# Patient Record
Sex: FEMALE | Race: WHITE | URBAN - METROPOLITAN AREA
[De-identification: names, ages, dates, MRNs, and addresses within clinical notes are randomized per-mention and may not be internally consistent; named-entity substitution may affect disease eponyms.]

---

## 2017-02-16 ENCOUNTER — APPOINTMENT (OUTPATIENT)
Dept: ULTRASOUND IMAGING | Facility: CLINIC | Age: 52
End: 2017-02-16
Attending: NURSE PRACTITIONER
Payer: COMMERCIAL

## 2017-02-16 ENCOUNTER — TRANSFERRED RECORDS (OUTPATIENT)
Dept: HEALTH INFORMATION MANAGEMENT | Facility: CLINIC | Age: 52
End: 2017-02-16

## 2017-02-16 ENCOUNTER — HOSPITAL ENCOUNTER (OUTPATIENT)
Facility: CLINIC | Age: 52
Setting detail: OBSERVATION
Discharge: HOME | End: 2017-02-17
Attending: NURSE PRACTITIONER | Admitting: FAMILY MEDICINE
Payer: COMMERCIAL

## 2017-02-16 DIAGNOSIS — D50.0 IRON DEFICIENCY ANEMIA DUE TO CHRONIC BLOOD LOSS: Primary | ICD-10-CM

## 2017-02-16 DIAGNOSIS — N93.9 VAGINAL BLEEDING: ICD-10-CM

## 2017-02-16 DIAGNOSIS — D64.9 ANEMIA, UNSPECIFIED TYPE: ICD-10-CM

## 2017-02-16 LAB
ABO + RH BLD: NORMAL
ABO + RH BLD: NORMAL
ALBUMIN SERPL-MCNC: 3.7 G/DL (ref 3.4–5)
ALP SERPL-CCNC: 82 U/L (ref 40–150)
ALT SERPL W P-5'-P-CCNC: 24 U/L (ref 0–50)
ANION GAP SERPL CALCULATED.3IONS-SCNC: 8 MMOL/L (ref 3–14)
AST SERPL W P-5'-P-CCNC: 24 U/L (ref 0–45)
BILIRUB SERPL-MCNC: 0.3 MG/DL (ref 0.2–1.3)
BLD GP AB SCN SERPL QL: NORMAL
BLD PROD TYP BPU: NORMAL
BLD PROD TYP BPU: NORMAL
BLD UNIT ID BPU: 0
BLOOD BANK CMNT PATIENT-IMP: NORMAL
BLOOD PRODUCT CODE: NORMAL
BPU ID: NORMAL
BUN SERPL-MCNC: 13 MG/DL (ref 7–30)
CALCIUM SERPL-MCNC: 8.2 MG/DL (ref 8.5–10.1)
CHLORIDE SERPL-SCNC: 108 MMOL/L (ref 94–109)
CO2 SERPL-SCNC: 26 MMOL/L (ref 20–32)
CREAT SERPL-MCNC: 0.7 MG/DL (ref 0.52–1.04)
ERYTHROCYTE [DISTWIDTH] IN BLOOD BY AUTOMATED COUNT: 15.6 % (ref 10–15)
GFR SERPL CREATININE-BSD FRML MDRD: 88 ML/MIN/1.7M2
GLUCOSE SERPL-MCNC: 95 MG/DL (ref 70–99)
HCT VFR BLD AUTO: 20.1 % (ref 35–47)
HGB BLD-MCNC: 6 G/DL (ref 11.7–15.7)
MCH RBC QN AUTO: 22.6 PG (ref 26.5–33)
MCHC RBC AUTO-ENTMCNC: 29.9 G/DL (ref 31.5–36.5)
MCV RBC AUTO: 76 FL (ref 78–100)
NUM BPU REQUESTED: 1
PLATELET # BLD AUTO: 459 10E9/L (ref 150–450)
POTASSIUM SERPL-SCNC: 3.3 MMOL/L (ref 3.4–5.3)
PROT SERPL-MCNC: 6.9 G/DL (ref 6.8–8.8)
RBC # BLD AUTO: 2.65 10E12/L (ref 3.8–5.2)
SODIUM SERPL-SCNC: 142 MMOL/L (ref 133–144)
SPECIMEN EXP DATE BLD: NORMAL
TRANSFUSION STATUS PATIENT QL: NORMAL
TRANSFUSION STATUS PATIENT QL: NORMAL
WBC # BLD AUTO: 10.3 10E9/L (ref 4–11)

## 2017-02-16 PROCEDURE — 25000128 H RX IP 250 OP 636: Performed by: FAMILY MEDICINE

## 2017-02-16 PROCEDURE — 80053 COMPREHEN METABOLIC PANEL: CPT | Performed by: NURSE PRACTITIONER

## 2017-02-16 PROCEDURE — 99219 ZZC INITIAL OBSERVATION CARE,LEVL II: CPT | Performed by: FAMILY MEDICINE

## 2017-02-16 PROCEDURE — 83605 ASSAY OF LACTIC ACID: CPT | Performed by: FAMILY MEDICINE

## 2017-02-16 PROCEDURE — 93005 ELECTROCARDIOGRAM TRACING: CPT

## 2017-02-16 PROCEDURE — 36415 COLL VENOUS BLD VENIPUNCTURE: CPT | Performed by: FAMILY MEDICINE

## 2017-02-16 PROCEDURE — 36430 TRANSFUSION BLD/BLD COMPNT: CPT

## 2017-02-16 PROCEDURE — 99285 EMERGENCY DEPT VISIT HI MDM: CPT | Mod: 25

## 2017-02-16 PROCEDURE — 86850 RBC ANTIBODY SCREEN: CPT | Performed by: NURSE PRACTITIONER

## 2017-02-16 PROCEDURE — 25000132 ZZH RX MED GY IP 250 OP 250 PS 637: Performed by: FAMILY MEDICINE

## 2017-02-16 PROCEDURE — P9016 RBC LEUKOCYTES REDUCED: HCPCS | Performed by: NURSE PRACTITIONER

## 2017-02-16 PROCEDURE — 85027 COMPLETE CBC AUTOMATED: CPT | Performed by: NURSE PRACTITIONER

## 2017-02-16 PROCEDURE — 76830 TRANSVAGINAL US NON-OB: CPT

## 2017-02-16 PROCEDURE — 86900 BLOOD TYPING SEROLOGIC ABO: CPT | Performed by: NURSE PRACTITIONER

## 2017-02-16 PROCEDURE — 86901 BLOOD TYPING SEROLOGIC RH(D): CPT | Performed by: NURSE PRACTITIONER

## 2017-02-16 PROCEDURE — 86923 COMPATIBILITY TEST ELECTRIC: CPT | Performed by: NURSE PRACTITIONER

## 2017-02-16 PROCEDURE — G0378 HOSPITAL OBSERVATION PER HR: HCPCS

## 2017-02-16 RX ORDER — IBUPROFEN 600 MG/1
600 TABLET, FILM COATED ORAL EVERY 6 HOURS PRN
Status: DISCONTINUED | OUTPATIENT
Start: 2017-02-16 | End: 2017-02-17 | Stop reason: HOSPADM

## 2017-02-16 RX ORDER — ACETAMINOPHEN 325 MG/1
650 TABLET ORAL EVERY 4 HOURS PRN
Status: DISCONTINUED | OUTPATIENT
Start: 2017-02-16 | End: 2017-02-17 | Stop reason: HOSPADM

## 2017-02-16 RX ORDER — SODIUM CHLORIDE 9 MG/ML
INJECTION, SOLUTION INTRAVENOUS CONTINUOUS
Status: DISCONTINUED | OUTPATIENT
Start: 2017-02-16 | End: 2017-02-17 | Stop reason: HOSPADM

## 2017-02-16 RX ORDER — NALOXONE HYDROCHLORIDE 0.4 MG/ML
.1-.4 INJECTION, SOLUTION INTRAMUSCULAR; INTRAVENOUS; SUBCUTANEOUS
Status: DISCONTINUED | OUTPATIENT
Start: 2017-02-16 | End: 2017-02-17 | Stop reason: HOSPADM

## 2017-02-16 RX ORDER — DOCUSATE SODIUM 100 MG/1
100 CAPSULE, LIQUID FILLED ORAL ONCE
Status: DISCONTINUED | OUTPATIENT
Start: 2017-02-16 | End: 2017-02-17 | Stop reason: HOSPADM

## 2017-02-16 RX ORDER — ZOLPIDEM TARTRATE 5 MG/1
5 TABLET ORAL
Status: DISCONTINUED | OUTPATIENT
Start: 2017-02-16 | End: 2017-02-17 | Stop reason: HOSPADM

## 2017-02-16 RX ADMIN — NORETHINDRONE AND ETHINYL ESTRADIOL 1 TABLET: KIT at 22:43

## 2017-02-16 RX ADMIN — SODIUM CHLORIDE: 9 INJECTION, SOLUTION INTRAVENOUS at 22:42

## 2017-02-16 ASSESSMENT — ENCOUNTER SYMPTOMS
SHORTNESS OF BREATH: 1
DIZZINESS: 1
LIGHT-HEADEDNESS: 1
BLOOD IN STOOL: 0

## 2017-02-16 NOTE — IP AVS SNAPSHOT
MRN:[id number]                      After Visit Summary   2/16/2017    Avelina Ta    MRN: [id number]           Thank you!     Thank you for choosing [facility] for your care. Our goal is always to provide you with excellent care. Hearing back from our patients is one way we can continue to improve our services. Please take a few minutes to complete the written survey that you may receive in the mail after you visit. If you would like to speak to someone directly about your visit please contact Patient Relations at [phone]. Thank you!          Patient Information     Date Of Birth          1965        About your hospital stay     You were admitted on:  February 16, 2017 You last received care in the:  [facility] Observation Department    You were discharged on:  February 17, 2017       Who to Call     For medical emergencies, please call 911.  For non-urgent questions about your medical care, please call your primary care provider or clinic, None          Attending Provider     Provider Specialty    David Steiner APRN CNP Family Practice    Myla Alvarez,  OB/Gyn       Primary Care Provider    Physician No Ref-Primary       No address on file         Follow-up Information     Follow up with [facility]. Schedule an appointment as soon as possible for a visit in 1 week(s).    Specialties:  Sports Medicine, Pain Management, Obstetrics & Gynecology, Pediatrics, Internal Medicine, Nephrology    Why:  With OB GYN in 1-2  weeks as long as bleeding is otherwise stable.    Contact information:    [address] [place] [address]  [facility] [address] [phone]                Pending Results     No orders found for last 3 day(s).            Statement of Approval     Ordered          02/17/17 1200  I have reviewed and agree with all the recommendations and orders detailed in this document.  EFFECTIVE NOW     Approved  "and electronically signed by:  Kaity Barrett MD             Admission Information     Date & Time Provider Department Dept. Phone    2017 Myla Alvarez DO [facility] Observation Department [phone]      Your Vitals Were     Blood Pressure Pulse Temperature Respirations Weight Pulse Oximetry    131/87 (BP Location: Left arm) 109 98.2  F (36.8  C) (Oral) 16 81.6 kg (180 lb) 99%      MyChart Information     [facility] lets you send messages to your doctor, view your test results, renew your prescriptions, schedule appointments and more. To sign up, go to [email or web address] . Click on \"Log in\" on the left side of the screen, which will take you to the Welcome page. Then click on \"Sign up Now\" on the right side of the page.     You will be asked to enter the access code listed below, as well as some personal information. Please follow the directions to create your username and password.     Your access code is: [email or web address]  Expires: 2017 12:03 PM     Your access code will  in 90 days. If you need help or a new code, please call your [facility] clinic or [phone].        Care EveryWhere ID     This is your Care EveryWhere ID. This could be used by other organizations to access your [facility] medical records  [id number]           Review of your medicines      START taking        Dose / Directions    docusate sodium 100 MG capsule   Commonly known as:  COLACE        Dose:  100 mg   Take 1 capsule (100 mg) by mouth 2 times daily   Quantity:  1 capsule   Refills:  0       ferrous sulfate 325 (65 FE) MG tablet   Commonly known as:  IRON        Dose:  325 mg   Take 1 tablet (325 mg) by mouth 2 times daily   Quantity:  60 tablet   Refills:  2       norethindrone-ethinyl estradiol 0.5-35 MG-MCG per tablet   Commonly known as:  NECON   Used for:  Vaginal bleeding        Take 4 pills a day for 2 days, then 3 pills a day for 3 days, then 2 pills a day for 3 days, then once daily. " Skip the last week of placebo pills and start a new pack right away.   Quantity:  28 tablet   Refills:  3            Where to get your medicines      These medications were sent to [facility], [place] - [address]  [address] [facility] [address]     Phone:  [phone]     ferrous sulfate 325 (65 FE) MG tablet    norethindrone-ethinyl estradiol 0.5-35 MG-MCG per tablet         Some of these will need a paper prescription and others can be bought over the counter. Ask your nurse if you have questions.     You don't need a prescription for these medications     docusate sodium 100 MG capsule                Protect others around you: Learn how to safely use, store and throw away your medicines at www.disposemymeds.org.             Medication List: This is a list of all your medications and when to take them. Check marks below indicate your daily home schedule. Keep this list as a reference.      Medications           Morning Afternoon Evening Bedtime As Needed    docusate sodium 100 MG capsule   Commonly known as:  COLACE   Take 1 capsule (100 mg) by mouth 2 times daily                                ferrous sulfate 325 (65 FE) MG tablet   Commonly known as:  IRON   Take 1 tablet (325 mg) by mouth 2 times daily                                norethindrone-ethinyl estradiol 0.5-35 MG-MCG per tablet   Commonly known as:  NECON   Take 4 pills a day for 2 days, then 3 pills a day for 3 days, then 2 pills a day for 3 days, then once daily. Skip the last week of placebo pills and start a new pack right away.                                          More Information        Heavy Menstrual Bleeding    Heavy menstrual bleeding means that your periods are heavier or longer than usual. You may soak through a pad or tampon every 1 to 3 hours on the heaviest days of your period. You may also pass large, dark clots. And your periods may last longer than 7 days.  If you have heavy  periods often, this can cause a problem called anemia. With anemia, your red blood cell count is too low. Red blood cells are needed because they help carry oxygen throughout your body. Severe anemia may cause you to look pale and feel weak or tired. You might also become short of breath easily.  There are many possible causes of heavy menstrual bleeding. Hormonal imbalance is the most common cause. Having benign growths in your uterus, such as fibroids or polyps, is another cause. Taking certain medicines or having certain health problems or bleeding disorders are also causes.  To treat heavy menstrual bleeding, medicines are often tried first. If these don t help, further testing and treatments will likely be needed.  Home care  Medicines  If you re prescribed medicines, be sure to take them as directed.    To help control heavy bleeding, any of the following may be used:    Hormone therapy (this includes all methods of hormonal birth control such as pills, shots, cream, ring, patch, or hormone-releasing IUD)    Nonsteroidal anti-inflammatory drugs (NSAIDs), such as ibuprofen    Antifibrinolytic medicines, such as transexamic acid    To help treat anemia, iron supplements may be prescribed.            General care    Get plenty of rest if you tire easily. Avoid heavy exertion.    To help relieve pain or cramping, try using a heating pad on the lower belly or back. A warm bath may also help.  Follow-up care  Follow up with your healthcare provider as directed.  When to seek medical advice  Call your healthcare provider right away if any of these occur:    Heavier bleeding (soaking 1 pad or tampon every hour for 3 hours)    Heavy bleeding that lasts longer than 1 week    Fever of 100.4 F (38 C) or higher, or as directed by your provider    Pain or cramping that gets worse instead of better    Signs of anemia such as pale skin, extreme fatigue or weakness, or shortness of breath    Dizziness or fainting    3344-2114  [employer]. [address] [place] [address], [place] [address]. All rights reserved. This information is not intended as a substitute for professional medical care. Always follow your healthcare professional's instructions.

## 2017-02-16 NOTE — IP AVS SNAPSHOT
[facility] Observation Department    [address]    [facility] [address]    Phone:  [phone]                                       After Visit Summary   2/16/2017    Aevlina Ta    MRN: [id number]           After Visit Summary Signature Page     I have received my discharge instructions, and my questions have been answered. I have discussed any challenges I see with this plan with the nurse or doctor.    ..........................................................................................................................................  Patient/Patient Representative Signature      ..........................................................................................................................................  Patient Representative Print Name and Relationship to Patient    ..................................................               ................................................  Date                                            Time    ..........................................................................................................................................  Reviewed by Signature/Title    ...................................................              ..............................................  Date                                                            Time

## 2017-02-17 VITALS
DIASTOLIC BLOOD PRESSURE: 87 MMHG | TEMPERATURE: 98.2 F | OXYGEN SATURATION: 99 % | HEART RATE: 109 BPM | SYSTOLIC BLOOD PRESSURE: 131 MMHG | WEIGHT: 180 LBS | RESPIRATION RATE: 16 BRPM

## 2017-02-17 LAB
ERYTHROCYTE [DISTWIDTH] IN BLOOD BY AUTOMATED COUNT: 15.9 % (ref 10–15)
HCT VFR BLD AUTO: 20.8 % (ref 35–47)
HGB BLD-MCNC: 6.5 G/DL (ref 11.7–15.7)
INTERPRETATION ECG - MUSE: NORMAL
LACTATE BLD-SCNC: 0.7 MMOL/L (ref 0.7–2.1)
MCH RBC QN AUTO: 23.9 PG (ref 26.5–33)
MCHC RBC AUTO-ENTMCNC: 31.3 G/DL (ref 31.5–36.5)
MCV RBC AUTO: 77 FL (ref 78–100)
PLATELET # BLD AUTO: 399 10E9/L (ref 150–450)
POTASSIUM SERPL-SCNC: 3.6 MMOL/L (ref 3.4–5.3)
RBC # BLD AUTO: 2.72 10E12/L (ref 3.8–5.2)
WBC # BLD AUTO: 6.7 10E9/L (ref 4–11)

## 2017-02-17 PROCEDURE — G0378 HOSPITAL OBSERVATION PER HR: HCPCS

## 2017-02-17 PROCEDURE — 25000128 H RX IP 250 OP 636: Performed by: FAMILY MEDICINE

## 2017-02-17 PROCEDURE — 96360 HYDRATION IV INFUSION INIT: CPT

## 2017-02-17 PROCEDURE — 85027 COMPLETE CBC AUTOMATED: CPT | Performed by: FAMILY MEDICINE

## 2017-02-17 PROCEDURE — 84132 ASSAY OF SERUM POTASSIUM: CPT | Performed by: FAMILY MEDICINE

## 2017-02-17 PROCEDURE — 99217 ZZC OBSERVATION CARE DISCHARGE: CPT | Performed by: OBSTETRICS & GYNECOLOGY

## 2017-02-17 PROCEDURE — 36415 COLL VENOUS BLD VENIPUNCTURE: CPT | Performed by: FAMILY MEDICINE

## 2017-02-17 PROCEDURE — 96361 HYDRATE IV INFUSION ADD-ON: CPT

## 2017-02-17 RX ORDER — FERROUS SULFATE 325(65) MG
325 TABLET ORAL 2 TIMES DAILY
Qty: 60 TABLET | Refills: 2 | Status: SHIPPED | OUTPATIENT
Start: 2017-02-17

## 2017-02-17 RX ORDER — DOCUSATE SODIUM 100 MG/1
100 CAPSULE, LIQUID FILLED ORAL 2 TIMES DAILY
Qty: 1 CAPSULE | Refills: 0 | COMMUNITY
Start: 2017-02-17

## 2017-02-17 RX ADMIN — SODIUM CHLORIDE: 9 INJECTION, SOLUTION INTRAVENOUS at 08:18

## 2017-02-17 NOTE — H&P
[facility]  History and Physical    Avelina Ta MRN# [id number]   Age: 51 year old YOB: 1965     Date of Admission:  2/16/2017    Primary care provider: No Ref-Primary, Physician           Chief Complaint:   Avelina Ta is a 51 year old female with anemia 2/2 menorrhagia for 14 days.   Admission to ER hemoglobin is 6.  She underwent 1 U PRBC in ER and will be admitted to   The floor for observation.  She hasn't had a physical or pap smear for 10+ years.       Active Problem List  Patient Active Problem List   Diagnosis     Anemia     Gyn History:   Not sexually active  Menarch 12-13   Menses monthly, irregular last few months     Ob hx:   No hx of pregnancy    Medication Prior to Admission    (Not in a hospital admission).        Maternal Past Medical History:   History reviewed. No pertinent past medical history.                    Family History:   This patient has no significant family history            Social History:   This patient has no significant social history         Review of Systems:   C: NEGATIVE for fever, chills, change in weight  I: NEGATIVE for worrisome rashes, moles or lesions  E: NEGATIVE for vision changes or irritation  E/M: NEGATIVE for ear, mouth and throat problems  R: NEGATIVE for significant cough or SOB  B: NEGATIVE for masses, tenderness or discharge  CV: NEGATIVE for chest pain, palpitations or peripheral edema  GI: NEGATIVE for nausea, abdominal pain, heartburn, or change in bowel habits  : NEGATIVE for frequency, dysuria, or hematuria  M: NEGATIVE for significant arthralgias or myalgia  N: NEGATIVE for weakness, dizziness or paresthesias  E: NEGATIVE for temperature intolerance, skin/hair changes  H: NEGATIVE for bleeding problems  P: NEGATIVE for changes in mood or affect          Physical Exam:     Vitals were reviewed  All vitals stable    Patient Vitals for the past 8 hrs:   BP Temp Temp src Pulse Heart Rate Resp SpO2 Weight   02/16/17 2130 (!)  161/97 98.7  F (37.1  C) - 119 - 24 - -   02/16/17 2115 (!) 157/91 98.5  F (36.9  C) - 119 - 22 - -   02/16/17 2045 - - - - - - 99 % -   02/16/17 2030 (!) 156/95 - - - - - 100 % -   02/16/17 2019 - - - - - - 100 % -   02/16/17 2018 (!) 156/98 - - - - - - -   02/16/17 1939 127/64 98  F (36.7  C) Temporal 115 115 18 100 % 81.6 kg (180 lb)     Constitutional: Awake, alert, cooperative, no apparent distress, and appears stated age.  Eyes: Lids and lashes normal, pupils equal, round and reactive to light, extra ocular muscles intact, sclera clear, conjunctiva normal.  ENT: Normocephalic, without obvious abnormality, atramatic, sinuses nontender on palpation, external ears without lesions, oral pharynx with moist mucus membranes  Neck: Supple, symmetrical, trachea midline, no adenopathy, thyroid symmetric, not enlarged and no tenderness, skin normal.  Hematologic / Lymphatic: No cervical lymphadenopathy and no supraclavicular lymphadenopathy.  Back: Symmetric, no curvature, spinous processes are non-tender on palpation, paraspinous muscles are non-tender on palpation, no costal vertebral tenderness.  Lungs: No increased work of breathing, good air exchange, clear to auscultation bilaterally, no crackles or wheezing.  Cardiovascular: Regular rate and rhythm, normal S1 and S2, no S3 or S4, and no murmur noted.  Abdomen: No scars, normal bowel sounds, soft, non-distended, non-tender, no masses palpated, no hepatosplenomegally.  Genitourinary: No urethral discharge, normal external genitalia, no hernia.  Musculoskeletal: No redness, warmth, or swelling of the joints.  Full range of motion noted.  Motor strength is 5 out of 5 all extremities bilaterally.  Tone is normal.  Neurologic: Awake, alert, oriented to name, place and time.  Cranial nerves II-XII are grossly intact.  Motor is 5 out of 5 bilaterally.   Skin: No rashes, erythema, pallor, petechia or purpura.   GYN PELVIC: NEGATIVE, normal external genitalia,  normal  vaginal mucosa, normal cervix and normal uterus, adnexa, no masses or tenderness On sterile vaginal exam.  Patient Declined speculum exam.  Mild vaginal bleeding.                    Assessment:   Avelina Ta is a 51 year old female with anemia 2/2 menorrhagia for 14 days.   Admission to ER hemoglobin is 6.  She is undergoing 1 U PRBC in ER and will be admitted to   The floor for observation.  She hasn't had a physical or pap smear for 10+ years.  Currently mild vaginal bleeding.          Plan:   Observation overnight   Repeat hemoglobin in am   Start taper pack of Oral Contraceptive 4 tablets today x 3 days, then 3 tab daily x 2 days, then   2 tablets for 2 days, then daily until no bleeding for 1 week.  Nortrel is available in ER pharmacy.      Myla Alvarez DO

## 2017-02-17 NOTE — PLAN OF CARE
Problem: Discharge Planning  Goal: Discharge Planning (Adult, OB, Behavioral, Peds)  Outcome: Improving  PRIMARY DIAGNOSIS: ANEMIA/ hgb 6.5  OUTPATIENT/OBSERVATION GOALS TO BE MET BEFORE DISCHARGE:  1. Vital Signs stable: Yes  2. Pain status: Pain free.  3. Number of Bleeding Episodes during this shift: patient continues to have menses,but reported less bleeding  4. ADLs back to baseline? Yes  5. Cleared by consultants? No  6. Barriers to discharge noted: No  7. Interpretation of rhythm per telemetry tech: n/a   Is patient a falls risk? No,pt denies weakness and dizziness when up walking  Falls armband on? YES  Within Arm's Reach? Yes   Bed alarm turned on? No  Personal alarm in place and turned on? No    Pt. discharge home in stable condition,pt walked.Discharge instruction reviewed with  The patient.

## 2017-02-17 NOTE — PLAN OF CARE
Problem: Discharge Planning  Goal: Discharge Planning (Adult, OB, Behavioral, Peds)  Outcome: Improving  PRIMARY DIAGNOSIS: ANEMIA/ hgb 6.0  OUTPATIENT/OBSERVATION GOALS TO BE MET BEFORE DISCHARGE:  1.     Vital Signs stable: Yes  2.     Pain status: Pain free.  3.     Number of Bleeding Episodes during this shift: patient continues to have menses  4.     ADLs back to baseline? Yes  5.     Cleared by consultants? No  6.     Barriers to discharge noted: No  7.     Interpretation of rhythm per telemetry tech: n/a   Is patient a falls risk? Yes  Reason for falls risk: Other- due to hgb being significantly low,states has felt weak  Falls armband on? YES  Within Arm's Reach? Yes   Bed alarm turned on? No  Personal alarm in place and turned on? No

## 2017-02-17 NOTE — PLAN OF CARE
Problem: Discharge Planning  Goal: Discharge Planning (Adult, OB, Behavioral, Peds)  Outcome: Improving  PRIMARY DIAGNOSIS: ANEMIA/ hgb 6.5  OUTPATIENT/OBSERVATION GOALS TO BE MET BEFORE DISCHARGE:  1. Vital Signs stable: Yes  2. Pain status: Pain free.  3. Number of Bleeding Episodes during this shift: patient continues to have menses,but reported less bleeding  4. ADLs back to baseline? Yes  5. Cleared by consultants? No  6. Barriers to discharge noted: No  7. Interpretation of rhythm per telemetry tech: n/a   Is patient a falls risk? No,pt denies weakness and dizziness when up walking  Falls armband on? YES  Within Arm's Reach? Yes   Bed alarm turned on? No  Personal alarm in place and turned on? No

## 2017-02-17 NOTE — PROVIDER NOTIFICATION
notified about pt's Hgb of 6.5.Discussed patient condition.Infromed that patient   is in stable condtion,VSS, and  had less vaginal  bleeding.

## 2017-02-17 NOTE — ED PROVIDER NOTES
History     Chief Complaint:  Abnormal labs    HPI   Avelina Ta is a 51 year old female who presents with abnormal labs. Today, the patient began experiencing dizziness, lightheadedness, and mild shortness of breath. She presented to the clinic and had a hemoglobin of 6. The patient states that she has been experiencing a heavy menstrual cycle for around 12+ days.  Currently bleeding has decreased and she has needed to change her pad every couple of hours instead of more frequently. She missed her period in December, but her last menstrual cycle was heavy and lasted for about 14 days as well. No blood in her stool. No cough or cold symptoms. The patient is not on blood thinners. She denies a history of cancer, anemia, or blood transfusion.     CBC:  WBC 11.6 (H), HGB 6.0 (L),  (H)    Cardiac Risk Factors   Sex: female   Tobacco: Negative   Hypertension: Negative  Diabetes: Negative  Hyperlipidemia: Negative  Family History: Negative    Allergies:  No known drug allergies.    Medications:    The patient is currently on no regular medications.    Past Medical History:    History reviewed.  No significant past medical history.     Past Surgical History:    History reviewed. No pertinent past surgical history.    Family History:    History reviewed. No pertinent family history.    Social History:  Presents to the ED alone    Review of Systems   Respiratory: Positive for shortness of breath.    Gastrointestinal: Negative for blood in stool.   Genitourinary: Positive for vaginal bleeding.   Neurological: Positive for dizziness and light-headedness.   All other systems reviewed and are negative.      Physical Exam   First Vitals:  BP: 127/64  Pulse: 115  Heart Rate: 115  Temp: 98  F (36.7  C)  Resp: 18  Weight: 81.6 kg (180 lb)  SpO2: 100 %    Physical Exam  General: Alert, No obvious discomfort, well kept  Eyes: PERRL, conjunctivae pallor. no scleral icterus or conjunctival injection  ENT:   Moist mucus  membranes, posterior oropharynx clear without erythema or exudates, No lymphadenopathy, Normal voice.   Resp:  Lungs clear to auscultation bilaterally, no crackles/rubs/wheezes. Good air movement  CV:  Tachycardic and rhythm, no murmurs/rubs/gallops  GI:  Abdomen soft and non-distended.  Normoactive BS.  No tenderness, guarding or rebound, No masses  : deferred to OB  Skin:  Warm, dry.  No rashes or petechiae. Pale.  Musculoskeletal: No peripheral edema or calf tenderness, Normal gross ROM   Neuro: Alert and oriented to person/place/time, normal sensation  Psychiatric: Normal affect, cooperative, good eye contact    Emergency Department Course   ECG:  @ 1944  Indication: abnormal labs  Vent. Rate 105 bpm. PA interval 164 ms. QRS duration 72 ms. QT/QTc 346/457 ms. P-R-T axis 52 3 28.   Sinus tachycardia. Otherwise normal ECG.    Read @ 1950    Imaging:  Radiographic findings were communicated with the patient who voiced understanding of the findings.    US Pelvic Complete with Transvaginal  1. The uterus is diffusely heterogeneous. The endometrial stripe is  not well visualized, but could be thickened. If clinically relevant,  endovaginal ultrasound imaging or an MR of the pelvis could be  considered for further evaluation. Note that the patient refused  endovaginal imaging on this study.  2. 3 cm right ovarian cyst. This is nonspecific, but could represent a  functional cyst.  3. No free fluid in the pelvis.  Preliminary radiology read.      Laboratory:  CBC:  WBC 10.3, HGB 6.0 (LL),  (H)  CMP: potassium 3.3 (L), calcium 8.2 (L), otherwise WNL (Creatinine 0.70)  ABO/Rh type and screen: A positive    Emergency Department Course:  The patient arrived in triage where vitals were measured and recorded.   The patient was then escorted back to the emergency department.   The patient's medical records were reviewed.  Nursing notes and vitals were reviewed.  I performed an exam of the patient as documented above.  The patient is in agreement with my plan of care.   EKG was done, interpretation as above.  A peripheral IV was established. Blood was drawn from the patient. This was sent for laboratory testing, findings above.   The patient was sent for a pelvic ultrasound while in the emergency department, findings above.   Findings and plan explained to the patient who consents to admission.   () I discussed the patient with Dr. Morales of ob/gyn, who will admit the patient to a observation bed for further monitoring, evaluation, and treatment.  I personally reviewed the laboratory results with the patient and answered all related questions prior to discharge.     Impression & Plan    Medical Decision Makin year old who presented for evaluation of anemia and heavy vaginal bleeding. She has been having a heavy period for the last 12-14 days. She presented to urgent care today because she was feeling dizzy, mildly short of breath, and lightheaded. She was found to have a hemoglobin of 6. Repeat laboratory studies here confirm that. A transfusion is ordered. I spoke to Dr. Morales OB who will follow with patient after admission. She will be admitted to the observation unit. Ultrasound results for follow up by Dr. Morales.    Diagnosis:    ICD-10-CM    1. Anemia, unspecified type D64.9 ABO/Rh type and screen     CBC (platelets, no diff)     Comprehensive metabolic panel     Blood component     Blood component   2. Vaginal bleeding N93.9        Disposition:  Admit to observation    Mike HERNANDES, am serving as a scribe on 2017 at 7:49 PM to personally document services performed by ROBERT Balderrama, CNP based on my observations and the provider's statements to me.        David Steiner APRN CNP  [phone]

## 2017-02-17 NOTE — PHARMACY-ADMISSION MEDICATION HISTORY
Medication Reconciliation is completed.  Patient is currently not taking any medications prior to this admission.                   February 16, 2017                    Reagan Yarbrough

## 2017-02-17 NOTE — ED NOTES
Pt sent over from clinic, hemoglobin of 6, having irregular, long heavy periods, SOB and dizziness with activity

## 2017-02-18 NOTE — DISCHARGE SUMMARY
[facility]    Discharge Summary  Gynecology    Date of Admission:  2/16/2017  Date of Discharge:  2/17/2017  2:39 PM  Discharging Provider: Kaity Barrett    Discharge Diagnoses   Active Problems:    Anemia  Menorrhagia, now resolving with treatment    Procedure/Surgery Information   Procedure: * No surgery found *   Surgeon(s): * Surgery not found *   Specimens: * Cannot find log *   Non-operative procedures None performed     History of Present Illness   Avelina Ta is a 51 year old female who presented with vaginal bleeding to the ED, was found to be anemic and tachycardic but stable. She was admitted received 1U prbc last evening.    Hospital Course   The patient's hospital course was notable for transfusion. She was started on oral contraceptive pill taper and her bleeding decreased significantly. She was stable at the time of discharge, ambulating, tolerating a regular diet, and was without pain.    Significant Findings: see ultrasound results. No endovaginal ultrasound was done due to patient discomfort, so that may need to be repeated.    Kaity Barrett    Discharge Disposition   Discharged to home   Condition at discharge: Good    Pending Results   Final pathology results: No pathology submitted  These results will be followed up by na  Unresulted Labs Ordered in the Past 30 Days of this Admission     No orders found from 12/18/2016 to 2/17/2017.          Primary Care Physician   Physician No Ref-Primary    Physical Exam   Temp: 98.2  F (36.8  C) Temp src: Oral BP: 131/87 Pulse: 109 Heart Rate: 92 Resp: 16 SpO2: 99 % O2 Device: None (Room air)    Vitals:    02/16/17 1939   Weight: 81.6 kg (180 lb)     Vital Signs with Ranges  Temp:  [97.2  F (36.2  C)-98.7  F (37.1  C)] 98.2  F (36.8  C)  Pulse:  [109] 109  Heart Rate:  [92-97] 92  Resp:  [16] 16  BP: (122-137)/(70-87) 131/87  SpO2:  [96 %-99 %] 99 %  I/O last 3 completed shifts:  In: 300   Out: -     Gen: appears well and is in no  distress.  Abdomen: soft, nt, nd.   Ext: negative for c/c/e  Pelvic: she declines.    Consultations This Hospital Stay   None    Time Spent on this Encounter   I have spent less than 30 minutes on this discharge.    Discharge Orders   No discharge procedures on file.  Discharge Medications   Discharge Medication List as of 2/17/2017  2:19 PM      START taking these medications    Details   docusate sodium (COLACE) 100 MG capsule Take 1 capsule (100 mg) by mouth 2 times daily, Disp-1 capsule, R-0, OTC      norethindrone-ethinyl estradiol (NECON) 0.5-35 MG-MCG per tablet Take 4 pills a day for 2 days, then 3 pills a day for 3 days, then 2 pills a day for 3 days, then once daily. Skip the last week of placebo pills and start a new pack right away., Disp-28 tablet, R-3, Fax      ferrous sulfate (IRON) 325 (65 FE) MG tablet Take 1 tablet (325 mg) by mouth 2 times daily, Disp-60 tablet, R-2, E-Prescribe           Allergies   No Known Allergies  Data   Most Recent 3 CBC's:  Recent Labs   Lab Test  02/17/17 0623 02/16/17 2005   WBC  6.7  10.3   HGB  6.5*  6.0*   MCV  77*  76*   PLT  399  459*      Most Recent 3 BMP's:  Recent Labs   Lab Test  02/17/17 0623 02/16/17 2005   NA   --   142   POTASSIUM  3.6  3.3*   CHLORIDE   --   108   CO2   --   26   BUN   --   13   CR   --   0.70   ANIONGAP   --   8   NABILA   --   8.2*   GLC   --   95     Most Recent 2 LFT's:  Recent Labs   Lab Test  02/16/17 2005   AST  24   ALT  24   ALKPHOS  82   BILITOTAL  0.3     Most Recent INR's and Anticoagulation Dosing History:  Anticoagulation Dose History     There is no flowsheet data to display.        Most Recent 3 Troponin's:No lab results found.  Most Recent Cholesterol Panel:No lab results found.  Most Recent 6 Bacteria Isolates From Any Culture (See EPIC Reports for Culture Details):No lab results found.  Most Recent TSH, T4 and A1c Labs:No lab results found.